# Patient Record
Sex: MALE | Race: WHITE | NOT HISPANIC OR LATINO | Employment: STUDENT | URBAN - NONMETROPOLITAN AREA
[De-identification: names, ages, dates, MRNs, and addresses within clinical notes are randomized per-mention and may not be internally consistent; named-entity substitution may affect disease eponyms.]

---

## 2023-11-22 ENCOUNTER — OFFICE VISIT (OUTPATIENT)
Dept: PRIMARY CARE | Facility: CLINIC | Age: 18
End: 2023-11-22
Payer: COMMERCIAL

## 2023-11-22 VITALS
TEMPERATURE: 97.6 F | WEIGHT: 123.8 LBS | SYSTOLIC BLOOD PRESSURE: 104 MMHG | HEIGHT: 68 IN | HEART RATE: 82 BPM | DIASTOLIC BLOOD PRESSURE: 60 MMHG | BODY MASS INDEX: 18.76 KG/M2

## 2023-11-22 DIAGNOSIS — M54.9 DORSALGIA: Primary | ICD-10-CM

## 2023-11-22 PROCEDURE — 1036F TOBACCO NON-USER: CPT | Performed by: FAMILY MEDICINE

## 2023-11-22 PROCEDURE — 99213 OFFICE O/P EST LOW 20 MIN: CPT | Performed by: FAMILY MEDICINE

## 2023-11-22 RX ORDER — LIDOCAINE HYDROCHLORIDE 20 MG/ML
SOLUTION OROPHARYNGEAL
COMMUNITY
Start: 2022-02-04

## 2023-11-22 RX ORDER — DICLOFENAC SODIUM 75 MG/1
75 TABLET, DELAYED RELEASE ORAL 2 TIMES DAILY PRN
Qty: 30 TABLET | Refills: 1 | Status: SHIPPED | OUTPATIENT
Start: 2023-11-22

## 2023-11-22 RX ORDER — AZELASTINE HYDROCHLORIDE, FLUTICASONE PROPIONATE 137; 50 UG/1; UG/1
SPRAY, METERED NASAL
COMMUNITY
Start: 2023-03-02

## 2023-11-22 ASSESSMENT — ENCOUNTER SYMPTOMS
DIZZINESS: 0
ENDOCRINE NEGATIVE: 1
NAUSEA: 0
DIFFICULTY URINATING: 0
FEVER: 0
SHORTNESS OF BREATH: 0
BACK PAIN: 1
DIARRHEA: 0

## 2023-11-22 ASSESSMENT — PATIENT HEALTH QUESTIONNAIRE - PHQ9
2. FEELING DOWN, DEPRESSED OR HOPELESS: NOT AT ALL
1. LITTLE INTEREST OR PLEASURE IN DOING THINGS: NOT AT ALL
SUM OF ALL RESPONSES TO PHQ9 QUESTIONS 1 AND 2: 0

## 2023-11-22 ASSESSMENT — PAIN SCALES - GENERAL: PAINLEVEL: 0-NO PAIN

## 2023-11-22 NOTE — PROGRESS NOTES
"Subjective   Patient ID: Yoan Jones is a 17 y.o. male who presents for Back Pain (X 2 months- heavy lifting at work).    Back pain due to lifting    Back Pain  Pertinent negatives include no chest pain or fever.        Review of Systems   Constitutional:  Negative for fever.        Also see HPI   Eyes:  Negative for visual disturbance.   Respiratory:  Negative for shortness of breath.    Cardiovascular:  Negative for chest pain.   Gastrointestinal:  Negative for diarrhea and nausea.   Endocrine: Negative.    Genitourinary:  Negative for difficulty urinating.   Musculoskeletal:  Positive for back pain.   Skin:  Negative for rash.   Neurological:  Negative for dizziness.        No focal deficits   Psychiatric/Behavioral:  Negative for suicidal ideas.    All other systems reviewed and are negative.      Objective   /60   Pulse 82   Temp 36.4 °C (97.6 °F)   Ht 1.727 m (5' 8\")   Wt 56.2 kg (123 lb 12.8 oz)   BMI 18.82 kg/m²     Physical Exam  Vitals and nursing note reviewed.   Constitutional:       Appearance: Normal appearance.   HENT:      Head: Normocephalic and atraumatic.   Eyes:      Extraocular Movements: Extraocular movements intact.      Conjunctiva/sclera: Conjunctivae normal.   Cardiovascular:      Rate and Rhythm: Normal rate and regular rhythm.      Heart sounds: Normal heart sounds.   Pulmonary:      Effort: Pulmonary effort is normal.      Breath sounds: Normal breath sounds.      Comments: Lungs essentially CTA b/l  Abdominal:      General: There is no distension.      Palpations: Abdomen is soft. There is no mass.      Tenderness: There is no abdominal tenderness.   Musculoskeletal:        Arms:       Thoracic back: Tenderness present.        Back:       Right lower leg: No edema.      Left lower leg: No edema.   Skin:     Coloration: Skin is not jaundiced.      Findings: No rash.   Neurological:      General: No focal deficit present.      Mental Status: He is alert and " oriented to person, place, and time.   Psychiatric:         Mood and Affect: Mood normal.         Behavior: Behavior normal.         Thought Content: Thought content normal.         Judgment: Judgment normal.       Assessment/Plan   Problem List Items Addressed This Visit    None  Visit Diagnoses         Codes    Dorsalgia    -  Primary M54.9    Relevant Medications    diclofenac (Voltaren) 75 mg EC tablet    Other Relevant Orders    XR thoracic spine 2 views

## 2025-03-11 ENCOUNTER — OFFICE VISIT (OUTPATIENT)
Dept: PRIMARY CARE | Facility: CLINIC | Age: 20
End: 2025-03-11
Payer: COMMERCIAL

## 2025-03-11 VITALS
OXYGEN SATURATION: 97 % | DIASTOLIC BLOOD PRESSURE: 62 MMHG | BODY MASS INDEX: 17.68 KG/M2 | WEIGHT: 119.4 LBS | HEIGHT: 69 IN | TEMPERATURE: 97.3 F | HEART RATE: 82 BPM | RESPIRATION RATE: 18 BRPM | SYSTOLIC BLOOD PRESSURE: 112 MMHG

## 2025-03-11 DIAGNOSIS — J00 ACUTE RHINITIS: ICD-10-CM

## 2025-03-11 DIAGNOSIS — J43.9 BLEB, LUNG (MULTI): Primary | ICD-10-CM

## 2025-03-11 PROCEDURE — 99213 OFFICE O/P EST LOW 20 MIN: CPT | Performed by: FAMILY MEDICINE

## 2025-03-11 PROCEDURE — 1036F TOBACCO NON-USER: CPT | Performed by: FAMILY MEDICINE

## 2025-03-11 PROCEDURE — 3008F BODY MASS INDEX DOCD: CPT | Performed by: FAMILY MEDICINE

## 2025-03-11 RX ORDER — OLOPATADINE HYDROCHLORIDE 665 UG/1
2 SPRAY NASAL 2 TIMES DAILY
Qty: 16 G | Refills: 3 | Status: SHIPPED | OUTPATIENT
Start: 2025-03-11

## 2025-03-11 ASSESSMENT — PAIN SCALES - GENERAL: PAINLEVEL_OUTOF10: 0-NO PAIN

## 2025-03-11 ASSESSMENT — ENCOUNTER SYMPTOMS
OCCASIONAL FEELINGS OF UNSTEADINESS: 0
DEPRESSION: 0
LOSS OF SENSATION IN FEET: 0

## 2025-03-11 ASSESSMENT — PATIENT HEALTH QUESTIONNAIRE - PHQ9
SUM OF ALL RESPONSES TO PHQ9 QUESTIONS 1 AND 2: 0
2. FEELING DOWN, DEPRESSED OR HOPELESS: NOT AT ALL
1. LITTLE INTEREST OR PLEASURE IN DOING THINGS: NOT AT ALL

## 2025-03-11 NOTE — PROGRESS NOTES
"Subjective   Patient ID: Yoan Jones is a 19 y.o. male who presents for Establish Care (Pt here to establish care he needed a new PCP).    HPI     Review of Systems    Objective   /62   Pulse 82   Temp 36.3 °C (97.3 °F)   Resp 18   Ht 1.74 m (5' 8.5\")   Wt 54.2 kg (119 lb 6.4 oz)   SpO2 97%   BMI 17.89 kg/m²     Physical Exam  Constitutional:       General: He is not in acute distress.     Appearance: Normal appearance.   Cardiovascular:      Rate and Rhythm: Normal rate and regular rhythm.      Heart sounds: No murmur heard.  Pulmonary:      Breath sounds: Normal breath sounds. No wheezing.   Neurological:      Mental Status: He is alert.         Assessment/Plan   Problem List Items Addressed This Visit    None  Visit Diagnoses         Codes    Bleb, lung (Multi)    -  Primary J43.9    Acute rhinitis     J00    Relevant Medications    olopatadine (Patanase) 0.6 % spray,non-aerosol nasal spray          Reassurance.       "

## 2025-06-03 ENCOUNTER — APPOINTMENT (OUTPATIENT)
Dept: OTOLARYNGOLOGY | Facility: CLINIC | Age: 20
End: 2025-06-03
Payer: COMMERCIAL

## 2025-06-03 ENCOUNTER — APPOINTMENT (OUTPATIENT)
Dept: AUDIOLOGY | Facility: CLINIC | Age: 20
End: 2025-06-03
Payer: COMMERCIAL

## 2025-06-03 VITALS — WEIGHT: 118 LBS | TEMPERATURE: 97.6 F | HEIGHT: 69 IN | BODY MASS INDEX: 17.48 KG/M2

## 2025-06-03 DIAGNOSIS — H93.13 TINNITUS OF BOTH EARS: Primary | ICD-10-CM

## 2025-06-03 DIAGNOSIS — H93.11 TINNITUS OF RIGHT EAR: Primary | ICD-10-CM

## 2025-06-03 DIAGNOSIS — R09.81 NASAL CONGESTION: ICD-10-CM

## 2025-06-03 DIAGNOSIS — R09.82 POSTNASAL DRIP: ICD-10-CM

## 2025-06-03 DIAGNOSIS — J31.0 CHRONIC RHINITIS: ICD-10-CM

## 2025-06-03 PROCEDURE — 1036F TOBACCO NON-USER: CPT

## 2025-06-03 PROCEDURE — 92550 TYMPANOMETRY & REFLEX THRESH: CPT | Performed by: AUDIOLOGIST

## 2025-06-03 PROCEDURE — 92552 PURE TONE AUDIOMETRY AIR: CPT | Performed by: AUDIOLOGIST

## 2025-06-03 PROCEDURE — 92556 SPEECH AUDIOMETRY COMPLETE: CPT | Performed by: AUDIOLOGIST

## 2025-06-03 PROCEDURE — 99204 OFFICE O/P NEW MOD 45 MIN: CPT

## 2025-06-03 PROCEDURE — 3008F BODY MASS INDEX DOCD: CPT

## 2025-06-03 NOTE — PROGRESS NOTES
AUDIOLOGY ADULT AUDIOMETRIC EVALUATION    Name:  Yoan Jones  :  2005  Age:  19 y.o.  Date of Evaluation:  Bailey 3, 2025    Reason for visit: Yoan is seen in the clinic today at the request of otolaryngology for an audiologic evaluation.     HISTORY  Patient reports intermittent ringing primarily in right ear for years.     He reports he can have difficulty understanding others in background noise.      EVALUATION  See scanned audiogram: “Media” > “Audiology Report”.      RESULTS  Otoscopic Evaluation:  Right Ear: clear ear canal  Left Ear: clear ear canal    Immittance Measures:  Tympanometry:  Right Ear: Type Ad, hypercompliant tympanic membrane mobility with normal middle ear pressure  Left Ear: Type A, normal tympanic membrane mobility with normal middle ear pressure    Acoustic Reflexes:  Ipsilateral Right Ear: Acoustic reflexes present within normal limits 500 Hz through 4000 Hz   Ipsilateral Left Ear: Acoustic reflexes present within normal limits 500 Hz through 4000 Hz   Contralateral Right Ear: did not evaluate  Contralateral Left Ear: did not evaluate    Distortion Product Otoacoustic Emissions (DPOAEs):  Right Ear: Passed 1000 Hz through 6000 Hz; refer at 8000 Hz   Left Ear: Passed 1000 Hz through 6000 Hz; refer at 8000 Hz     Audiometry:  Test Technique and Reliability:   Standard audiometry via supra-aural headphones. Reliability is good.    Pure tone air conduction audiometry:  Right Ear: Hearing sensitivity within normal limits 125 Hz through 8000 Hz   Left Ear: Hearing sensitivity within normal limits 125 Hz through 8000 Hz     Speech Audiometry (Word Recognition Scores):   Right Ear: Excellent at most comfortable level of loudness   Left Ear: Excellent at most comfortable level of loudness     IMPRESSIONS    Hearing sensitivity within normal limits 125 Hz through 8000 Hz bilaterally    RECOMMENDATIONS  - Follow up with otolaryngology today as scheduled.  - Audiologic  evaluation as needed.  - Discussed communication strategies    PATIENT EDUCATION  Discussed results, impressions and recommendations with the patient. Questions were addressed and the patient was encouraged to contact our office should concerns arise.    Time for this encounter: 1100/1120    Yasmin Pena M.A., CCC/A   Licensed Audiologist

## 2025-06-03 NOTE — PROGRESS NOTES
"Chief Complaint   Patient presents with    Follow-up     LOV 3/23 DR. JACKSON, RINGING IN RT. EAR ON AND OFF X Q1 YEAR, F/U AUDIO     HPI:  Yoan Jones is a 19 y.o. male presents for complaints of tinnitus which he has noticed for about a year he says it is occasional and generally nonbothersome.  Denies noise exposure, otalgia or otorrhea.  Reports chronic nasal congestion.  Is not using anything for this at this time.  Reports allergy testing several years ago.  He reports using various nasal sprays which work for a short time but then stopped working.  We reviewed today's audiogram it shows normal hearing sensitivity 125 Hz through 8000 Hz bilaterally, excellent word recognition bilaterally and Type AD tympanometry on the right and Type A on the left.          PMH:  Medical History[1]  Surgical History[2]      Medications:   Current Medications[3]     Allergies:  Allergies[4]     ROS:  Review of systems normal unless stated otherwise in the HPI and/or PMH.    Physical Exam:  Temperature 36.4 °C (97.6 °F), height 1.74 m (5' 8.5\"), weight 53.5 kg (118 lb). Body mass index is 17.68 kg/m².     GENERAL APPEARANCE: Well developed and well nourished.  Alert and oriented in no acute distress.  Normal vocal quality.      HEAD/FACE: No erythema or edema or facial tenderness.  Normal facial nerve function bilaterally.    EAR:       EXTERNAL: Normal pinnas and external auditory canals without lesion or obstructing wax.       MIDDLE EAR: Tympanic membranes intact and mobile with normal landmarks.  Middle ear space appears well aerated.       TUBE STATUS: N/A       MASTOID CAVITY: N/A       HEARING: Gross hearing assessment is within normal limits.      NOSE:       VISUALIZED USING: Anterior rhinoscopy with headlight and nasal speculum.       DORSUM: Midline, nontraumatic appearance.       MUCOSA: Normal-appearing.       SECRETIONS: Normal.       SEPTUM: Midline and nonobstructing.       INFERIOR TURBINATES: " Enlarged       MIDDLE TURBINATES/MEATUS: N/A       BLEEDING: N/A         ORAL CAVITY/PHARYNX:       TEETH: Adequate dentition.       TONGUE: No mass or lesion.  Normal mobility.       FLOOR OF MOUTH: No mass or lesion.       PALATE: Normal hard palate, soft palate, and uvula.       OROPHARYNX: Normal without mass or lesion.       BUCCAL MUCOSA/GBS: Normal without mass or lesion.       LIPS: Normal.    LARYNX/HYPOPHARYNX/NASOPHARYNX: N/A    NECK: No palpable masses or abnormal adenopathy.  Trachea is midline.    THYROID: No thyromegaly or palpable nodule.    SALIVARY GLANDS: Normal bilateral parotid and submandibular glands by inspection and palpation.    TMJ's: Normal.    NEURO: Cranial nerve exam grossly normal bilaterally.       Assessment/Plan   Yoan was seen today for follow-up.  Diagnoses and all orders for this visit:  Tinnitus of both ears (Primary)  Nasal congestion  Chronic rhinitis  Postnasal drip     Discussed tinnitus management including masking with music or white noise, stress reduction- including meditation or yoga, sleep hygiene, healthy lifestyle including exercise, limiting caffeine & alcohol, staying hydrated, avoidance of noise exposure and wearing hearing protection.  Discussed allergy testing and types of treatment including avoidance, medications, injections.  Offered topical nasal steroid, he declined due to past experiences.  Reviewed Dr. Tellez's recommendations with him again.  He will go home and talk to his mother.  He schedule with Dr. Tellez if he is interested in turbinate reduction.  No follow-ups on file.     DEBORAH Hilario-CNP         [1]   Past Medical History:  Diagnosis Date    Acute nasopharyngitis (common cold) 11/11/2019    Acute rhinitis    Chronic nasopharyngitis 08/07/2020    Chronic nasopharyngitis    Personal history of other diseases of the respiratory system 09/04/2019    History of adenoiditis    Personal history of other diseases of the respiratory system  02/07/2020    History of acute sinusitis    Personal history of other diseases of the respiratory system 11/11/2019    History of deviated nasal septum    Personal history of other diseases of the respiratory system 06/09/2020    History of chronic sinusitis    Personal history of other diseases of the respiratory system 05/08/2020    History of allergic rhinitis    Personal history of other specified conditions 05/08/2020    History of nasal congestion    Spontaneous pneumothorax 12/09/2024   [2]   Past Surgical History:  Procedure Laterality Date    OTHER SURGICAL HISTORY  11/21/2019    adnoids    OTHER SURGICAL HISTORY Bilateral 12/12/2024    bilateral chest tube placement for penumothorax   [3]   Current Outpatient Medications:     azelastine-fluticasone (Dymista) 137-50 mcg/spray nasal spray, USE 1 SPRAY IN EACH NOSTRIL 2 TIMES DAY (Patient not taking: Reported on 6/3/2025), Disp: , Rfl:     diclofenac (Voltaren) 75 mg EC tablet, Take 1 tablet (75 mg) by mouth 2 times a day as needed (pain). Do not crush, chew, or split. (Patient not taking: Reported on 6/3/2025), Disp: 30 tablet, Rfl: 1    lidocaine (Xylocaine) 2 % solution, Using a cotton swab, apply to the affected lesions Mouth/Throat 4 times each day as needed (Patient not taking: Reported on 6/3/2025), Disp: , Rfl:     olopatadine (Patanase) 0.6 % spray,non-aerosol nasal spray, Administer 2 sprays into affected nostril(s) 2 times a day. (Patient not taking: Reported on 6/3/2025), Disp: 16 g, Rfl: 3  [4] No Known Allergies